# Patient Record
Sex: MALE | Race: WHITE
[De-identification: names, ages, dates, MRNs, and addresses within clinical notes are randomized per-mention and may not be internally consistent; named-entity substitution may affect disease eponyms.]

---

## 2018-08-10 ENCOUNTER — HOSPITAL ENCOUNTER (OUTPATIENT)
Dept: HOSPITAL 88 - MRI | Age: 38
End: 2018-08-10
Attending: FAMILY MEDICINE
Payer: COMMERCIAL

## 2018-08-10 DIAGNOSIS — S83.206A: Primary | ICD-10-CM

## 2018-08-10 NOTE — DIAGNOSTIC IMAGING REPORT
TECHNIQUE:

Magnetic resonance imaging of the RIGHT KNEE was performed WITHOUT injected

contrast. 



HISTORY:  Right knee pain

COMPARISON:  None available.



FINDINGS: 

LIGAMENTS AND TENDONS:

     ACL:  Intact

     PCL:  Intact

     Collateral ligaments:  Intact

     Iliotibial band:  Unremarkable

     Popliteal tendon:  Intact

     Extensor mechanism:  Intact



JOINT:

     Menisci: 

          Medial:  Intact

          Lateral:  Intact



     Articular Cartilage:

          Medial Compartment:  No focal defect.

          Lateral Compartment:  No focal defect.

          Patellofemoral Compartment:  Focal high-grade fissure to the patellar

apex. Partial-thickness loss of the medial facet and medial trochlea.



     Joint Fluid: Small joint effusion.



BONE:  

No focal or infiltrative bone marrow replacing abnormality.  

No acute fracture.



SOFT TISSUES:

Otherwise, unremarkable.





IMPRESSION: 



No acute osseous, ligamentous, or meniscal abnormality.



Patellofemoral cartilage loss.



Signed by: Dr. Andrew Palisch, M.D. on 8/10/2018 3:28 PM